# Patient Record
Sex: MALE | Race: BLACK OR AFRICAN AMERICAN | NOT HISPANIC OR LATINO | Employment: FULL TIME | URBAN - METROPOLITAN AREA
[De-identification: names, ages, dates, MRNs, and addresses within clinical notes are randomized per-mention and may not be internally consistent; named-entity substitution may affect disease eponyms.]

---

## 2023-04-26 ENCOUNTER — OFFICE VISIT (OUTPATIENT)
Dept: URGENT CARE | Facility: CLINIC | Age: 28
End: 2023-04-26

## 2023-04-26 VITALS
RESPIRATION RATE: 16 BRPM | DIASTOLIC BLOOD PRESSURE: 78 MMHG | HEART RATE: 70 BPM | TEMPERATURE: 98 F | HEIGHT: 69 IN | OXYGEN SATURATION: 97 % | WEIGHT: 188.5 LBS | BODY MASS INDEX: 27.92 KG/M2 | SYSTOLIC BLOOD PRESSURE: 118 MMHG

## 2023-04-26 DIAGNOSIS — H10.33 ACUTE CONJUNCTIVITIS OF BOTH EYES, UNSPECIFIED ACUTE CONJUNCTIVITIS TYPE: Primary | ICD-10-CM

## 2023-04-26 PROCEDURE — 99214 OFFICE O/P EST MOD 30 MIN: CPT | Mod: TIER,S$GLB,, | Performed by: NURSE PRACTITIONER

## 2023-04-26 PROCEDURE — 99214 PR OFFICE/OUTPT VISIT, EST, LEVL IV, 30-39 MIN: ICD-10-PCS | Mod: TIER,S$GLB,, | Performed by: NURSE PRACTITIONER

## 2023-04-26 RX ORDER — POLYMYXIN B SULFATE AND TRIMETHOPRIM 1; 10000 MG/ML; [USP'U]/ML
1 SOLUTION OPHTHALMIC EVERY 6 HOURS
Qty: 10 ML | Refills: 0 | Status: SHIPPED | OUTPATIENT
Start: 2023-04-26 | End: 2023-05-01

## 2023-04-26 NOTE — PROGRESS NOTES
"Subjective:      Patient ID: Arturo Velez is a 27 y.o. male.    Vitals:  height is 5' 9.29" (1.76 m) and weight is 85.5 kg (188 lb 7.9 oz). His oral temperature is 98.3 °F (36.8 °C). His blood pressure is 118/78 and his pulse is 70. His respiration is 16 and oxygen saturation is 97%.     Chief Complaint: Eye Problem    26 yo male presents with bilateral eye redness, photophobia, and watery discharge starting last night. Eye symptoms have improved since onset last night but verbalizes concern that he has pink eye. States that he got insulation in his eyes yesterday morning. He flushed his eyes and denies any eye symptoms until later that night.     Eye Problem   Both eyes are affected. This is a new problem. The current episode started yesterday. The problem has been gradually improving. The injury mechanism is unknown. The patient is experiencing no pain. There is No known exposure to pink eye. He Does not wear contacts. Associated symptoms include an eye discharge, eye redness and photophobia. Pertinent negatives include no blurred vision, double vision, fever, foreign body sensation, itching, nausea, recent URI or vomiting. He has tried water and eye drops for the symptoms. The treatment provided no relief.     Constitution: Negative for fever.   HENT:  Negative for congestion.    Eyes:  Positive for eye trauma, eye discharge, eye redness and photophobia. Negative for foreign body in eye, eye itching, eye pain, vision loss, double vision, blurred vision and eyelid swelling.   Respiratory:  Negative for cough.    Gastrointestinal:  Negative for nausea and vomiting.    Objective:     Physical Exam   Constitutional: He is oriented to person, place, and time. He appears well-developed.   HENT:   Head: Normocephalic and atraumatic.   Ears:   Right Ear: External ear normal.   Left Ear: External ear normal.   Nose: Nose normal.   Mouth/Throat: Oropharynx is clear and moist.   Eyes: EOM and lids are normal. Pupils are " equal, round, and reactive to light. Lids are everted and swept, no foreign bodies found. Right conjunctiva is injected. Left conjunctiva is injected.   Slit lamp exam:       The right eye shows no corneal abrasion and no fluorescein uptake.        The left eye shows no corneal abrasion and no fluorescein uptake. Extraocular movement intact right eye Estella exam negative  left eye Estella exam negative  Neck: Trachea normal and phonation normal. Neck supple.   Cardiovascular: Normal rate, regular rhythm, normal heart sounds and normal pulses.   Pulmonary/Chest: Effort normal and breath sounds normal. No respiratory distress.   Musculoskeletal: Normal range of motion.         General: Normal range of motion.   Neurological: He is alert and oriented to person, place, and time.   Skin: Skin is warm, dry and intact.   Psychiatric: His speech is normal and behavior is normal. Judgment and thought content normal.   Nursing note and vitals reviewed.    Assessment:     1. Acute conjunctivitis of both eyes, unspecified acute conjunctivitis type        Plan:     Acute conjunctivitis of both eyes, unspecified acute conjunctivitis type  -     polymyxin B sulf-trimethoprim (POLYTRIM) 10,000 unit- 1 mg/mL Drop; Place 1 drop into both eyes every 6 (six) hours. for 5 days  Dispense: 10 mL; Refill: 0    During assessment determined eye symptoms most likely related to work related exposure to insulation. Advised to discuss injury and symptoms with work. Use antibiotic eye drops as prescribed. Advised that if symptoms do not continue to improve or resolve over the next 24 hours he needs to follow up with optometry/ophthalmology. Patient verbalizes understanding.

## 2023-04-26 NOTE — PATIENT INSTRUCTIONS
Use eye drops as prescribed  Follow up with an eye doctor if your symptoms do not continue to improve or get worse over the next 24 hours